# Patient Record
Sex: MALE | Race: BLACK OR AFRICAN AMERICAN | Employment: FULL TIME | URBAN - METROPOLITAN AREA
[De-identification: names, ages, dates, MRNs, and addresses within clinical notes are randomized per-mention and may not be internally consistent; named-entity substitution may affect disease eponyms.]

---

## 2024-08-19 ENCOUNTER — OFFICE VISIT (OUTPATIENT)
Dept: PRIMARY CARE CLINIC | Age: 34
End: 2024-08-19
Payer: COMMERCIAL

## 2024-08-19 ENCOUNTER — HOSPITAL ENCOUNTER (OUTPATIENT)
Age: 34
Discharge: HOME | End: 2024-08-19
Payer: COMMERCIAL

## 2024-08-19 VITALS
OXYGEN SATURATION: 97 % | HEART RATE: 101 BPM | BODY MASS INDEX: 40.16 KG/M2 | HEIGHT: 68 IN | TEMPERATURE: 98.2 F | SYSTOLIC BLOOD PRESSURE: 120 MMHG | DIASTOLIC BLOOD PRESSURE: 85 MMHG | WEIGHT: 265 LBS

## 2024-08-19 DIAGNOSIS — D17.21 LIPOMA OF RIGHT UPPER EXTREMITY: ICD-10-CM

## 2024-08-19 DIAGNOSIS — Z00.00 ANNUAL PHYSICAL EXAM: ICD-10-CM

## 2024-08-19 DIAGNOSIS — E11.9 TYPE 2 DIABETES MELLITUS WITHOUT COMPLICATION, WITHOUT LONG-TERM CURRENT USE OF INSULIN (HCC): ICD-10-CM

## 2024-08-19 DIAGNOSIS — Z00.00 ANNUAL PHYSICAL EXAM: Primary | ICD-10-CM

## 2024-08-19 LAB
ALBUMIN SERPL-MCNC: 4.2 G/DL (ref 3.4–5)
ALBUMIN/GLOB SERPL: 1.5 {RATIO} (ref 1.1–2.2)
ALP SERPL-CCNC: 74 U/L (ref 40–129)
ALT SERPL-CCNC: 25 U/L (ref 10–40)
ANION GAP SERPL CALCULATED.3IONS-SCNC: 10 MMOL/L (ref 3–16)
AST SERPL-CCNC: 26 U/L (ref 15–37)
BASOPHILS # BLD: 0.1 K/UL (ref 0–0.2)
BASOPHILS NFR BLD: 0.9 %
BILIRUB SERPL-MCNC: 0.4 MG/DL (ref 0–1)
BUN SERPL-MCNC: 16 MG/DL (ref 7–20)
CALCIUM SERPL-MCNC: 9.5 MG/DL (ref 8.3–10.6)
CHLORIDE SERPL-SCNC: 103 MMOL/L (ref 99–110)
CHOLEST SERPL-MCNC: 154 MG/DL (ref 0–199)
CO2 SERPL-SCNC: 21 MMOL/L (ref 21–32)
CREAT SERPL-MCNC: 1.2 MG/DL (ref 0.9–1.3)
CREAT UR-MCNC: 421 MG/DL (ref 39–259)
DEPRECATED RDW RBC AUTO: 13.3 % (ref 12.4–15.4)
EOSINOPHIL # BLD: 0 K/UL (ref 0–0.6)
EOSINOPHIL NFR BLD: 0.6 %
GFR SERPLBLD CREATININE-BSD FMLA CKD-EPI: 80 ML/MIN/{1.73_M2}
GLUCOSE SERPL-MCNC: 172 MG/DL (ref 70–99)
HCT VFR BLD AUTO: 37.9 % (ref 40.5–52.5)
HDLC SERPL-MCNC: 31 MG/DL (ref 40–60)
HGB BLD-MCNC: 11.6 G/DL (ref 13.5–17.5)
LDLC SERPL CALC-MCNC: 93 MG/DL
LYMPHOCYTES # BLD: 2.9 K/UL (ref 1–5.1)
LYMPHOCYTES NFR BLD: 38.9 %
MCH RBC QN AUTO: 23.9 PG (ref 26–34)
MCHC RBC AUTO-ENTMCNC: 30.7 G/DL (ref 31–36)
MCV RBC AUTO: 78.1 FL (ref 80–100)
MICROALBUMIN UR DL<=1MG/L-MCNC: 2.84 MG/DL
MICROALBUMIN/CREAT UR: 6.7 MG/G (ref 0–30)
MONOCYTES # BLD: 0.7 K/UL (ref 0–1.3)
MONOCYTES NFR BLD: 8.8 %
NEUTROPHILS # BLD: 3.8 K/UL (ref 1.7–7.7)
NEUTROPHILS NFR BLD: 50.8 %
PLATELET # BLD AUTO: 313 K/UL (ref 135–450)
PMV BLD AUTO: 10.6 FL (ref 5–10.5)
POTASSIUM SERPL-SCNC: 3.9 MMOL/L (ref 3.5–5.1)
PROT SERPL-MCNC: 7 G/DL (ref 6.4–8.2)
RBC # BLD AUTO: 4.86 M/UL (ref 4.2–5.9)
SODIUM SERPL-SCNC: 134 MMOL/L (ref 136–145)
TRIGL SERPL-MCNC: 152 MG/DL (ref 0–150)
VLDLC SERPL CALC-MCNC: 30 MG/DL
WBC # BLD AUTO: 7.5 K/UL (ref 4–11)

## 2024-08-19 PROCEDURE — 83036 HEMOGLOBIN GLYCOSYLATED A1C: CPT

## 2024-08-19 PROCEDURE — 80061 LIPID PANEL: CPT

## 2024-08-19 PROCEDURE — 85025 COMPLETE CBC W/AUTO DIFF WBC: CPT

## 2024-08-19 PROCEDURE — 82043 UR ALBUMIN QUANTITATIVE: CPT

## 2024-08-19 PROCEDURE — 36415 COLL VENOUS BLD VENIPUNCTURE: CPT

## 2024-08-19 PROCEDURE — 80053 COMPREHEN METABOLIC PANEL: CPT

## 2024-08-19 PROCEDURE — 99385 PREV VISIT NEW AGE 18-39: CPT

## 2024-08-19 PROCEDURE — 82570 ASSAY OF URINE CREATININE: CPT

## 2024-08-19 RX ORDER — DULAGLUTIDE 1.5 MG/.5ML
3 INJECTION, SOLUTION SUBCUTANEOUS WEEKLY
COMMUNITY
Start: 2024-07-15

## 2024-08-19 SDOH — ECONOMIC STABILITY: INCOME INSECURITY: HOW HARD IS IT FOR YOU TO PAY FOR THE VERY BASICS LIKE FOOD, HOUSING, MEDICAL CARE, AND HEATING?: SOMEWHAT HARD

## 2024-08-19 SDOH — HEALTH STABILITY: PHYSICAL HEALTH: ON AVERAGE, HOW MANY MINUTES DO YOU ENGAGE IN EXERCISE AT THIS LEVEL?: 90 MIN

## 2024-08-19 SDOH — ECONOMIC STABILITY: FOOD INSECURITY: WITHIN THE PAST 12 MONTHS, YOU WORRIED THAT YOUR FOOD WOULD RUN OUT BEFORE YOU GOT MONEY TO BUY MORE.: NEVER TRUE

## 2024-08-19 SDOH — ECONOMIC STABILITY: FOOD INSECURITY: WITHIN THE PAST 12 MONTHS, THE FOOD YOU BOUGHT JUST DIDN'T LAST AND YOU DIDN'T HAVE MONEY TO GET MORE.: NEVER TRUE

## 2024-08-19 SDOH — HEALTH STABILITY: PHYSICAL HEALTH: ON AVERAGE, HOW MANY DAYS PER WEEK DO YOU ENGAGE IN MODERATE TO STRENUOUS EXERCISE (LIKE A BRISK WALK)?: 3 DAYS

## 2024-08-19 ASSESSMENT — PATIENT HEALTH QUESTIONNAIRE - PHQ9
2. FEELING DOWN, DEPRESSED OR HOPELESS: NOT AT ALL
SUM OF ALL RESPONSES TO PHQ QUESTIONS 1-9: 0
SUM OF ALL RESPONSES TO PHQ QUESTIONS 1-9: 0
1. LITTLE INTEREST OR PLEASURE IN DOING THINGS: NOT AT ALL
SUM OF ALL RESPONSES TO PHQ9 QUESTIONS 1 & 2: 0
SUM OF ALL RESPONSES TO PHQ QUESTIONS 1-9: 0
SUM OF ALL RESPONSES TO PHQ QUESTIONS 1-9: 0

## 2024-08-19 ASSESSMENT — ENCOUNTER SYMPTOMS
CONSTIPATION: 0
VOMITING: 0
WHEEZING: 0
NAUSEA: 0
DIARRHEA: 0
COUGH: 0
SHORTNESS OF BREATH: 0

## 2024-08-19 NOTE — PROGRESS NOTES
PROGRESS NOTE   [facility] Family and Community Medicine [facility]idency [facility]                                  [address], [address], [facility] [address]         Phone: [phone]    Date of Service:  8/19/2024     Patient ID: Tom Ryder is a 34 y.o. male      Subjective:     CC:   Chief Complaint   Patient presents with    New Patient       HPI  Patient is a 34 y.o. year old male that has Type 2 diabetes mellitus (HCC) and Microcytic anemia on their problem list..  Patient is presenting for above mentioned concerns.    Last A1c of 7.7 in January  Taking medications appropriately  No concerns with medications  Has been trying to improve diet  Does no monitor sugars at home    Concerns of mass on right arm  Ongoing for a while now  Minimal changes in size  Triceps region  No pain, swelling, tenderness, warmth, or drainage    ROS:    Review of Systems   Constitutional:  Negative for chills, fatigue and fever.   HENT:  Negative for congestion and tinnitus.    Eyes:  Negative for visual disturbance.   Respiratory:  Negative for cough, shortness of breath and wheezing.    Cardiovascular:  Negative for chest pain, palpitations and leg swelling.   Gastrointestinal:  Negative for constipation, diarrhea, nausea and vomiting.   Genitourinary:  Negative for difficulty urinating, dysuria, flank pain and hematuria.   Neurological:  Negative for dizziness, weakness, light-headedness, numbness and headaches.       Vitals:    08/19/24 1110 08/19/24 1119 08/19/24 1138   BP: (!) 142/102 (!) 146/104 120/85   Site: Right Upper Arm Right Upper Arm    Position: Sitting Sitting    Cuff Size: Large Adult Large Adult    Pulse: (!) 101     Temp: 98.2 °F (36.8 °C)     TempSrc: Oral     SpO2: 97%     Weight: 120.2 kg (265 lb)     Height: 1.718 m (5' 7.64\")         Allergies:  Patient has no known allergies.    Outpatient Medications Marked as Taking for the 8/19/24 encounter (Office Visit) with Mo

## 2024-08-19 NOTE — ASSESSMENT & PLAN NOTE
Unclear control, continue current medications  Last A1c of 7.7 in Jan 2024  Will repeat A1c check today

## 2024-08-20 LAB
EST. AVERAGE GLUCOSE BLD GHB EST-MCNC: 122.6 MG/DL
HBA1C MFR BLD: 5.9 %

## 2024-12-06 ENCOUNTER — OFFICE VISIT (OUTPATIENT)
Dept: PRIMARY CARE CLINIC | Age: 34
End: 2024-12-06
Payer: COMMERCIAL

## 2024-12-06 ENCOUNTER — HOSPITAL ENCOUNTER (OUTPATIENT)
Age: 34
Discharge: HOME | End: 2024-12-06
Payer: COMMERCIAL

## 2024-12-06 VITALS
HEART RATE: 91 BPM | DIASTOLIC BLOOD PRESSURE: 88 MMHG | WEIGHT: 258.4 LBS | OXYGEN SATURATION: 98 % | BODY MASS INDEX: 39.71 KG/M2 | SYSTOLIC BLOOD PRESSURE: 122 MMHG

## 2024-12-06 DIAGNOSIS — E11.9 TYPE 2 DIABETES MELLITUS WITHOUT COMPLICATION, WITHOUT LONG-TERM CURRENT USE OF INSULIN (HCC): Primary | ICD-10-CM

## 2024-12-06 DIAGNOSIS — E11.9 TYPE 2 DIABETES MELLITUS WITHOUT COMPLICATION, WITHOUT LONG-TERM CURRENT USE OF INSULIN (HCC): ICD-10-CM

## 2024-12-06 LAB
ALBUMIN SERPL-MCNC: 4.2 G/DL (ref 3.4–5)
ALBUMIN/GLOB SERPL: 1.4 {RATIO} (ref 1.1–2.2)
ALP SERPL-CCNC: 82 U/L (ref 40–129)
ALT SERPL-CCNC: 82 U/L (ref 10–40)
ANION GAP SERPL CALCULATED.3IONS-SCNC: 12 MMOL/L (ref 3–16)
AST SERPL-CCNC: 37 U/L (ref 15–37)
BILIRUB SERPL-MCNC: 0.3 MG/DL (ref 0–1)
BUN SERPL-MCNC: 18 MG/DL (ref 7–20)
CALCIUM SERPL-MCNC: 9.5 MG/DL (ref 8.3–10.6)
CHLORIDE SERPL-SCNC: 105 MMOL/L (ref 99–110)
CHOLEST SERPL-MCNC: 163 MG/DL (ref 0–199)
CO2 SERPL-SCNC: 22 MMOL/L (ref 21–32)
CREAT SERPL-MCNC: 1.2 MG/DL (ref 0.9–1.3)
EST. AVERAGE GLUCOSE BLD GHB EST-MCNC: 139.9 MG/DL
GFR SERPLBLD CREATININE-BSD FMLA CKD-EPI: 80 ML/MIN/{1.73_M2}
GLUCOSE SERPL-MCNC: 122 MG/DL (ref 70–99)
HBA1C MFR BLD: 6.5 %
HDLC SERPL-MCNC: 32 MG/DL (ref 40–60)
LDLC SERPL CALC-MCNC: 108 MG/DL
POTASSIUM SERPL-SCNC: 4.3 MMOL/L (ref 3.5–5.1)
PROT SERPL-MCNC: 7.1 G/DL (ref 6.4–8.2)
SODIUM SERPL-SCNC: 139 MMOL/L (ref 136–145)
TRIGL SERPL-MCNC: 115 MG/DL (ref 0–150)
VLDLC SERPL CALC-MCNC: 23 MG/DL

## 2024-12-06 PROCEDURE — 3044F HG A1C LEVEL LT 7.0%: CPT | Performed by: FAMILY MEDICINE

## 2024-12-06 PROCEDURE — 99213 OFFICE O/P EST LOW 20 MIN: CPT | Performed by: FAMILY MEDICINE

## 2024-12-06 PROCEDURE — 80053 COMPREHEN METABOLIC PANEL: CPT

## 2024-12-06 PROCEDURE — 36415 COLL VENOUS BLD VENIPUNCTURE: CPT

## 2024-12-06 PROCEDURE — 80061 LIPID PANEL: CPT

## 2024-12-06 PROCEDURE — 83036 HEMOGLOBIN GLYCOSYLATED A1C: CPT

## 2024-12-06 RX ORDER — CLINDAMYCIN PHOSPHATE 10 UG/ML
LOTION TOPICAL
COMMUNITY
Start: 2024-11-19

## 2024-12-06 RX ORDER — DULAGLUTIDE 3 MG/.5ML
3 INJECTION, SOLUTION SUBCUTANEOUS WEEKLY
Qty: 6 ML | Refills: 3 | Status: SHIPPED | OUTPATIENT
Start: 2024-12-06

## 2024-12-06 ASSESSMENT — ENCOUNTER SYMPTOMS
COUGH: 0
CHEST TIGHTNESS: 0
WHEEZING: 0
SHORTNESS OF BREATH: 0
ABDOMINAL PAIN: 1
DIARRHEA: 1

## 2024-12-06 NOTE — PROGRESS NOTES
Glucose 08/19/2024 172 (H)  70 - 99 mg/dL Final    BUN 08/19/2024 16  7 - 20 mg/dL Final    Creatinine 08/19/2024 1.2  0.9 - 1.3 mg/dL Final    Est, Glom Filt Rate 08/19/2024 80  >60 Final    Calcium 08/19/2024 9.5  8.3 - 10.6 mg/dL Final    Total Protein 08/19/2024 7.0  6.4 - 8.2 g/dL Final    Albumin 08/19/2024 4.2  3.4 - 5.0 g/dL Final    Albumin/Globulin Ratio 08/19/2024 1.5  1.1 - 2.2 Final    Total Bilirubin 08/19/2024 0.4  0.0 - 1.0 mg/dL Final    Alkaline Phosphatase 08/19/2024 74  40 - 129 U/L Final    ALT 08/19/2024 25  10 - 40 U/L Final    AST 08/19/2024 26  15 - 37 U/L Final    Cholesterol, Total 08/19/2024 154  0 - 199 mg/dL Final    Triglycerides 08/19/2024 152 (H)  0 - 150 mg/dL Final    HDL 08/19/2024 31 (L)  40 - 60 mg/dL Final    LDL Cholesterol 08/19/2024 93  <100 mg/dL Final    VLDL Cholesterol Calculated 08/19/2024 30  Not Established mg/dL Final       An electronic signature was used to authenticate this note.  --Matt Cadet MD

## 2025-02-10 ENCOUNTER — PATIENT MESSAGE (OUTPATIENT)
Dept: PRIMARY CARE CLINIC | Age: 35
End: 2025-02-10

## 2025-02-10 DIAGNOSIS — M79.10 MYALGIA: Primary | ICD-10-CM

## 2025-02-10 DIAGNOSIS — R03.0 ELEVATED BLOOD PRESSURE READING WITHOUT DIAGNOSIS OF HYPERTENSION: ICD-10-CM

## 2025-02-11 NOTE — TELEPHONE ENCOUNTER
[phone]    Patient not available VM advises to text or email VM not checked regularly.  [employer] message sent will try again at 430 pm.

## 2025-02-11 NOTE — TELEPHONE ENCOUNTER
[phone]    2/14/2025 11:40 AM (40 min)  Sena    OFFICE VISIT   [facility] ([facility])   Rudy Santos MD

## 2025-02-12 SDOH — ECONOMIC STABILITY: INCOME INSECURITY: IN THE LAST 12 MONTHS, WAS THERE A TIME WHEN YOU WERE NOT ABLE TO PAY THE MORTGAGE OR RENT ON TIME?: PATIENT DECLINED

## 2025-02-12 SDOH — ECONOMIC STABILITY: FOOD INSECURITY: WITHIN THE PAST 12 MONTHS, THE FOOD YOU BOUGHT JUST DIDN'T LAST AND YOU DIDN'T HAVE MONEY TO GET MORE.: PATIENT DECLINED

## 2025-02-12 SDOH — ECONOMIC STABILITY: TRANSPORTATION INSECURITY
IN THE PAST 12 MONTHS, HAS LACK OF TRANSPORTATION KEPT YOU FROM MEETINGS, WORK, OR FROM GETTING THINGS NEEDED FOR DAILY LIVING?: NO

## 2025-02-12 SDOH — ECONOMIC STABILITY: FOOD INSECURITY: WITHIN THE PAST 12 MONTHS, YOU WORRIED THAT YOUR FOOD WOULD RUN OUT BEFORE YOU GOT MONEY TO BUY MORE.: SOMETIMES TRUE

## 2025-02-12 SDOH — ECONOMIC STABILITY: TRANSPORTATION INSECURITY
IN THE PAST 12 MONTHS, HAS THE LACK OF TRANSPORTATION KEPT YOU FROM MEDICAL APPOINTMENTS OR FROM GETTING MEDICATIONS?: NO

## 2025-02-12 ASSESSMENT — PATIENT HEALTH QUESTIONNAIRE - PHQ9
SUM OF ALL RESPONSES TO PHQ QUESTIONS 1-9: 0
2. FEELING DOWN, DEPRESSED OR HOPELESS: NOT AT ALL
SUM OF ALL RESPONSES TO PHQ9 QUESTIONS 1 & 2: 0
1. LITTLE INTEREST OR PLEASURE IN DOING THINGS: NOT AT ALL
SUM OF ALL RESPONSES TO PHQ9 QUESTIONS 1 & 2: 0
SUM OF ALL RESPONSES TO PHQ QUESTIONS 1-9: 0
1. LITTLE INTEREST OR PLEASURE IN DOING THINGS: NOT AT ALL
SUM OF ALL RESPONSES TO PHQ QUESTIONS 1-9: 0
2. FEELING DOWN, DEPRESSED OR HOPELESS: NOT AT ALL
SUM OF ALL RESPONSES TO PHQ QUESTIONS 1-9: 0

## 2025-02-13 RX ORDER — VALSARTAN AND HYDROCHLOROTHIAZIDE 80; 12.5 MG/1; MG/1
1 TABLET, FILM COATED ORAL DAILY
Qty: 30 TABLET | Refills: 5 | Status: SHIPPED | OUTPATIENT
Start: 2025-02-13

## 2025-02-14 ENCOUNTER — HOSPITAL ENCOUNTER (OUTPATIENT)
Age: 35
Discharge: HOME | End: 2025-02-14
Payer: COMMERCIAL

## 2025-02-14 ENCOUNTER — OFFICE VISIT (OUTPATIENT)
Dept: PRIMARY CARE CLINIC | Age: 35
End: 2025-02-14

## 2025-02-14 VITALS
DIASTOLIC BLOOD PRESSURE: 100 MMHG | SYSTOLIC BLOOD PRESSURE: 125 MMHG | OXYGEN SATURATION: 98 % | BODY MASS INDEX: 39.77 KG/M2 | TEMPERATURE: 98.1 F | HEART RATE: 101 BPM | WEIGHT: 258.8 LBS

## 2025-02-14 DIAGNOSIS — R03.0 ELEVATED BLOOD PRESSURE READING WITHOUT DIAGNOSIS OF HYPERTENSION: Primary | ICD-10-CM

## 2025-02-14 DIAGNOSIS — M79.10 MYALGIA: ICD-10-CM

## 2025-02-14 DIAGNOSIS — R19.5 DARK STOOLS: ICD-10-CM

## 2025-02-14 DIAGNOSIS — R53.83 FATIGUE, UNSPECIFIED TYPE: ICD-10-CM

## 2025-02-14 DIAGNOSIS — R03.0 ELEVATED BLOOD PRESSURE READING WITHOUT DIAGNOSIS OF HYPERTENSION: ICD-10-CM

## 2025-02-14 DIAGNOSIS — R82.71 BACTERIA IN URINE: ICD-10-CM

## 2025-02-14 LAB
ALBUMIN SERPL-MCNC: 4.2 G/DL (ref 3.4–5)
ALBUMIN/GLOB SERPL: 1.6 {RATIO} (ref 1.1–2.2)
ALP SERPL-CCNC: 77 U/L (ref 40–129)
ALT SERPL-CCNC: 72 U/L (ref 10–40)
ANION GAP SERPL CALCULATED.3IONS-SCNC: 11 MMOL/L (ref 3–16)
AST SERPL-CCNC: 39 U/L (ref 15–37)
BILIRUB SERPL-MCNC: 0.3 MG/DL (ref 0–1)
BILIRUBIN, POC: NEGATIVE
BLOOD URINE, POC: NEGATIVE
BUN SERPL-MCNC: 16 MG/DL (ref 7–20)
CALCIUM SERPL-MCNC: 9.3 MG/DL (ref 8.3–10.6)
CHLORIDE SERPL-SCNC: 103 MMOL/L (ref 99–110)
CK SERPL-CCNC: 368 U/L (ref 39–308)
CLARITY, POC: CLEAR
CO2 SERPL-SCNC: 23 MMOL/L (ref 21–32)
COLOR, POC: YELLOW
CREAT SERPL-MCNC: 1.1 MG/DL (ref 0.9–1.3)
GFR SERPLBLD CREATININE-BSD FMLA CKD-EPI: 89 ML/MIN/{1.73_M2}
GLUCOSE SERPL-MCNC: 216 MG/DL (ref 70–99)
GLUCOSE URINE, POC: 250 MG/DL
KETONES, POC: NEGATIVE MG/DL
LEUKOCYTE EST, POC: NEGATIVE
NITRITE, POC: NEGATIVE
PH, POC: 6
POTASSIUM SERPL-SCNC: 4.5 MMOL/L (ref 3.5–5.1)
PROT SERPL-MCNC: 6.8 G/DL (ref 6.4–8.2)
PROTEIN, POC: 100 MG/DL
SODIUM SERPL-SCNC: 137 MMOL/L (ref 136–145)
SPECIFIC GRAVITY, POC: 1.02
TSH SERPL DL<=0.005 MIU/L-ACNC: 1.2 UIU/ML (ref 0.27–4.2)
UROBILINOGEN, POC: 0.2 MG/DL

## 2025-02-14 PROCEDURE — 80053 COMPREHEN METABOLIC PANEL: CPT

## 2025-02-14 PROCEDURE — 36415 COLL VENOUS BLD VENIPUNCTURE: CPT

## 2025-02-14 PROCEDURE — 82550 ASSAY OF CK (CPK): CPT

## 2025-02-14 PROCEDURE — 84443 ASSAY THYROID STIM HORMONE: CPT

## 2025-02-14 ASSESSMENT — ENCOUNTER SYMPTOMS: BLOOD IN STOOL: 1

## 2025-02-14 NOTE — PROGRESS NOTES
[facility]                               Family and Community Medicine Residency Practice                                             [address], [address], [place] [address]        Phone: [phone]      Name:  Gold Ryder  :    1990    Consultants:   Patient Care Team:  Matt Cadet MD as PCP - General  Matt Cadet MD as PCP - Empaneled Provider      Chief Complaint:     Gold Ryder is a 34 y.o. male  who presents today for an established patient care visit with Personalized Prevention Plan Services as noted below.      Assessment/Plan:      1. Elevated blood pressure reading without diagnosis of hypertension  33 yo M presenting with history of elevated blood pressure readings on several occasions, with no official diagnosis of HTN. Repeat BP's in office also elevated. Patent recently started on diovan-HCT. Instructed patient to follow up in 2 weeks with completed BP log to guide further management.     2. Dark stools  ED notes and bloodwork from [facility] reviewed. Today he is feeling much better as his abdominal pain has resolved and his stool has returned to normal. Recent labs show microcytic anemia, with renal function and hepatic function WNL. Pending repeat CBC to assess for worsening anemia.   - Roxi Roa MD, Gastroenterology, [facility]    3. Fatigue, unspecified type  The patients associated fatigue may have been to anemia from an acute GI bleed, but may be chronic due to the patient's work schedule. Ordering TSH to r/o hyperthyroidism.   - TSH reflex to FT4; Future    4. Bacteria in urine  Bacteria noted in urine on lab work from recent ED visit. UA in office significant for elevated glucose. Will send for culture to r/o UTI due to co-occurring diabetes and dulaglutide regimen.   - POCT Urinalysis no Micro  - Culture, Urine       Health Maintenance Due:  Health Maintenance Due   Topic Date Due

## 2025-02-14 NOTE — PATIENT INSTRUCTIONS
Please keep log of your blood pressure in the morning and night for 2 weeks in preparation for next visit.

## 2025-02-16 LAB — BACTERIA UR CULT: NORMAL

## 2025-02-28 ENCOUNTER — PATIENT MESSAGE (OUTPATIENT)
Dept: PRIMARY CARE CLINIC | Age: 35
End: 2025-02-28

## 2025-02-28 ENCOUNTER — OFFICE VISIT (OUTPATIENT)
Dept: PRIMARY CARE CLINIC | Age: 35
End: 2025-02-28
Payer: COMMERCIAL

## 2025-02-28 VITALS
BODY MASS INDEX: 37.47 KG/M2 | TEMPERATURE: 97.6 F | RESPIRATION RATE: 16 BRPM | WEIGHT: 247.2 LBS | OXYGEN SATURATION: 98 % | SYSTOLIC BLOOD PRESSURE: 138 MMHG | DIASTOLIC BLOOD PRESSURE: 80 MMHG | HEART RATE: 90 BPM | HEIGHT: 68 IN

## 2025-02-28 DIAGNOSIS — I21.19 ST ELEVATION MYOCARDIAL INFARCTION (STEMI) INVOLVING OTHER CORONARY ARTERY OF INFERIOR WALL (HCC): Primary | ICD-10-CM

## 2025-02-28 PROCEDURE — 99214 OFFICE O/P EST MOD 30 MIN: CPT

## 2025-02-28 RX ORDER — CARVEDILOL 25 MG/1
25 TABLET ORAL 2 TIMES DAILY WITH MEALS
COMMUNITY
Start: 2025-02-20

## 2025-02-28 RX ORDER — FAMOTIDINE 20 MG/1
20 TABLET, FILM COATED ORAL 2 TIMES DAILY PRN
COMMUNITY
Start: 2025-02-11

## 2025-02-28 RX ORDER — SACUBITRIL AND VALSARTAN 24; 26 MG/1; MG/1
1 TABLET, FILM COATED ORAL 2 TIMES DAILY
COMMUNITY
Start: 2025-02-20

## 2025-02-28 RX ORDER — SPIRONOLACTONE 25 MG/1
25 TABLET ORAL DAILY
COMMUNITY
Start: 2025-02-21

## 2025-02-28 RX ORDER — ASPIRIN 81 MG/1
81 TABLET, CHEWABLE ORAL DAILY
COMMUNITY
Start: 2025-02-21

## 2025-02-28 RX ORDER — CLOPIDOGREL BISULFATE 75 MG/1
75 TABLET ORAL DAILY
COMMUNITY
Start: 2025-02-21

## 2025-02-28 RX ORDER — ATORVASTATIN CALCIUM 40 MG/1
40 TABLET, FILM COATED ORAL DAILY
COMMUNITY
Start: 2025-02-21

## 2025-02-28 RX ORDER — INSULIN GLARGINE-YFGN 100 [IU]/ML
INJECTION, SOLUTION SUBCUTANEOUS
COMMUNITY
Start: 2025-02-25

## 2025-02-28 RX ORDER — INSULIN GLARGINE 100 [IU]/ML
10 INJECTION, SOLUTION SUBCUTANEOUS NIGHTLY
COMMUNITY
Start: 2025-02-20

## 2025-02-28 RX ORDER — MULTIVITAMIN
1 TABLET ORAL DAILY
COMMUNITY

## 2025-02-28 RX ORDER — DAPAGLIFLOZIN 10 MG/1
10 TABLET, FILM COATED ORAL DAILY
COMMUNITY
Start: 2025-02-21

## 2025-02-28 ASSESSMENT — ENCOUNTER SYMPTOMS: SHORTNESS OF BREATH: 0

## 2025-02-28 NOTE — PROGRESS NOTES
NEGATIVE     Ketones, UA 02/14/2025 NEGATIVE     Spec Grav, UA 02/14/2025 1.025     Blood, UA POC 02/14/2025 NEGATIVE     pH, UA 02/14/2025 6.0     Protein, UA POC 02/14/2025 100     Urobilinogen, UA 02/14/2025 0.2     Leukocytes, UA 02/14/2025 NEGATIVE     Nitrite, UA 02/14/2025 NEGATIVE     Urine Culture, Routine 02/14/2025 No growth at 18 to 36 hours               Health Maintenance: (USPSTF Recommendations)  (F) Breast Cancer Screen: (40-49 (C), 50-74 biennial screening mammogram (B))  (F) Cervical Cancer Screen: (21-29 q3yr cytology alone; 30-65 q3yr cytology alone, q5yr with hrHPV alone, or q5yr cytology+hrHPV (A))  (M) Prostate Cancer Screen: (55-68 yo discuss benefits/harm, does not recommend testing PSA in men >71 yo (D):   (M) AAA Screen: (men 65-76 yo who has ever smoked (B), consider in nonsmokers if high risk):  CRC/Colonoscopy Screening: (adults 45-49 (B), 50-75 (A))  Lung Ca Screening: Annual LDCT (+smoker age 50-80, smoked within 15 years, total of 20 pack yr history (B)):  DEXA Screen: (women >65 and older, <65 if at risk/postmenopausal (B))  HIV Screen: (15-65 yr old, and all pregnant patients (A)):  Hep C Screen: (18-79 yr old (B)):  HCC Screen: (all pts with cirrhosis and high risk Hep B (US q6 mo)):  Immunizations:    RTC:  No follow-ups on file.   Return to clinic if symptoms worsen or fail to improve.     EMR Dragon/transcription disclaimer:  Much of this encounter note is electronic transcription/translation of spoken language to printed texts.  The electronic translation of spoken language may be erroneous, or at times, nonsensical words or phrases may be inadvertently transcribed.  Although I have reviewed the note for such errors, some may still exist.          Rudy Santos MD  PGY-1 Family and Community Medicine Residency

## 2025-03-03 NOTE — TELEPHONE ENCOUNTER
I would strongly recommend NO alcohol especially as you recover from heart attack  If you are experiencing ongoing chest pain, I recommend you either come into our office to be evaluated or if it feels like chest pressure that radiates to your left jaw or left arm then I recommend an immediate trip to the ER.

## 2025-03-04 ENCOUNTER — TELEPHONE (OUTPATIENT)
Dept: PRIMARY CARE CLINIC | Age: 35
End: 2025-03-04

## 2025-03-04 NOTE — TELEPHONE ENCOUNTER
[phone]  Spoke to pt. He stated to fax to [employer] Attn: Peg Dixon at   [phone]    Forms scanned into media as well as attached in [employer] message for pt to have a copy

## 2025-03-04 NOTE — TELEPHONE ENCOUNTER
Patient is calling and stating that his [facility] paper work is trying to be faxed to his phone number and it needs to be faxed to [phone]. Please advise

## 2025-03-04 NOTE — TELEPHONE ENCOUNTER
Called pt he stated not concerned at this time not sure what was going on and has not happened again.

## 2025-03-24 ENCOUNTER — TELEPHONE (OUTPATIENT)
Dept: PRIMARY CARE CLINIC | Age: 35
End: 2025-03-24

## 2025-03-24 NOTE — TELEPHONE ENCOUNTER
Pharmacy called patient pharmacy stating his   Dulaglutide (TRULICITY) 3 MG/0.5ML SOA needs a pa will route to the pa department

## 2025-03-25 NOTE — TELEPHONE ENCOUNTER
Submitted PA for Dulaglutide (TRULICITY) 3 MG/0.5ML SOAJ   [facility]  [id number] STATUS: PENDING.    Follow up done daily; if no decision with in three days we will refax.  If another three days goes by with no decision will call the insurance for status.

## 2025-03-28 ENCOUNTER — OFFICE VISIT (OUTPATIENT)
Dept: PRIMARY CARE CLINIC | Age: 35
End: 2025-03-28
Payer: COMMERCIAL

## 2025-03-28 VITALS
DIASTOLIC BLOOD PRESSURE: 78 MMHG | BODY MASS INDEX: 38.73 KG/M2 | HEART RATE: 87 BPM | WEIGHT: 252 LBS | SYSTOLIC BLOOD PRESSURE: 96 MMHG | OXYGEN SATURATION: 99 %

## 2025-03-28 DIAGNOSIS — I25.2 HISTORY OF ACUTE MYOCARDIAL INFARCTION OF INFERIOR WALL: ICD-10-CM

## 2025-03-28 DIAGNOSIS — E11.9 TYPE 2 DIABETES MELLITUS WITHOUT COMPLICATION, WITHOUT LONG-TERM CURRENT USE OF INSULIN: ICD-10-CM

## 2025-03-28 DIAGNOSIS — I10 ESSENTIAL HYPERTENSION: Primary | ICD-10-CM

## 2025-03-28 DIAGNOSIS — I50.22 CHRONIC SYSTOLIC HEART FAILURE (HCC): ICD-10-CM

## 2025-03-28 PROCEDURE — 99214 OFFICE O/P EST MOD 30 MIN: CPT

## 2025-03-28 PROCEDURE — 3078F DIAST BP <80 MM HG: CPT

## 2025-03-28 PROCEDURE — 3074F SYST BP LT 130 MM HG: CPT

## 2025-03-28 RX ORDER — ACYCLOVIR 400 MG/1
1 TABLET ORAL
Qty: 9 EACH | Refills: 1 | Status: SHIPPED | OUTPATIENT
Start: 2025-03-28 | End: 2025-09-24

## 2025-03-28 RX ORDER — ACYCLOVIR 400 MG/1
1 TABLET ORAL DAILY
Qty: 1 EACH | Refills: 0 | Status: SHIPPED | OUTPATIENT
Start: 2025-03-28

## 2025-03-28 SDOH — ECONOMIC STABILITY: FOOD INSECURITY: WITHIN THE PAST 12 MONTHS, YOU WORRIED THAT YOUR FOOD WOULD RUN OUT BEFORE YOU GOT MONEY TO BUY MORE.: NEVER TRUE

## 2025-03-28 SDOH — ECONOMIC STABILITY: FOOD INSECURITY: WITHIN THE PAST 12 MONTHS, THE FOOD YOU BOUGHT JUST DIDN'T LAST AND YOU DIDN'T HAVE MONEY TO GET MORE.: NEVER TRUE

## 2025-03-28 ASSESSMENT — PATIENT HEALTH QUESTIONNAIRE - PHQ9
6. FEELING BAD ABOUT YOURSELF - OR THAT YOU ARE A FAILURE OR HAVE LET YOURSELF OR YOUR FAMILY DOWN: NOT AT ALL
10. IF YOU CHECKED OFF ANY PROBLEMS, HOW DIFFICULT HAVE THESE PROBLEMS MADE IT FOR YOU TO DO YOUR WORK, TAKE CARE OF THINGS AT HOME, OR GET ALONG WITH OTHER PEOPLE: NOT DIFFICULT AT ALL
4. FEELING TIRED OR HAVING LITTLE ENERGY: SEVERAL DAYS
SUM OF ALL RESPONSES TO PHQ QUESTIONS 1-9: 1
7. TROUBLE CONCENTRATING ON THINGS, SUCH AS READING THE NEWSPAPER OR WATCHING TELEVISION: NOT AT ALL
SUM OF ALL RESPONSES TO PHQ QUESTIONS 1-9: 1
5. POOR APPETITE OR OVEREATING: NOT AT ALL
SUM OF ALL RESPONSES TO PHQ QUESTIONS 1-9: 1
SUM OF ALL RESPONSES TO PHQ QUESTIONS 1-9: 1
9. THOUGHTS THAT YOU WOULD BE BETTER OFF DEAD, OR OF HURTING YOURSELF: NOT AT ALL
2. FEELING DOWN, DEPRESSED OR HOPELESS: NOT AT ALL
3. TROUBLE FALLING OR STAYING ASLEEP: NOT AT ALL
8. MOVING OR SPEAKING SO SLOWLY THAT OTHER PEOPLE COULD HAVE NOTICED. OR THE OPPOSITE, BEING SO FIGETY OR RESTLESS THAT YOU HAVE BEEN MOVING AROUND A LOT MORE THAN USUAL: NOT AT ALL
1. LITTLE INTEREST OR PLEASURE IN DOING THINGS: NOT AT ALL

## 2025-03-28 ASSESSMENT — ANXIETY QUESTIONNAIRES
5. BEING SO RESTLESS THAT IT IS HARD TO SIT STILL: NOT AT ALL
3. WORRYING TOO MUCH ABOUT DIFFERENT THINGS: NOT AT ALL
7. FEELING AFRAID AS IF SOMETHING AWFUL MIGHT HAPPEN: NOT AT ALL
4. TROUBLE RELAXING: NOT AT ALL
1. FEELING NERVOUS, ANXIOUS, OR ON EDGE: NOT AT ALL
GAD7 TOTAL SCORE: 0
IF YOU CHECKED OFF ANY PROBLEMS ON THIS QUESTIONNAIRE, HOW DIFFICULT HAVE THESE PROBLEMS MADE IT FOR YOU TO DO YOUR WORK, TAKE CARE OF THINGS AT HOME, OR GET ALONG WITH OTHER PEOPLE: NOT DIFFICULT AT ALL
2. NOT BEING ABLE TO STOP OR CONTROL WORRYING: NOT AT ALL
6. BECOMING EASILY ANNOYED OR IRRITABLE: NOT AT ALL

## 2025-03-28 NOTE — PROGRESS NOTES
[facility]                               Family and Community Medicine Residency Practice                                             [address], [address], [place] [address]        Phone: [phone]      Name:  Gold Ryder  :    1990    Consultants:   Patient Care Team:  Matt Cadet MD as PCP - General  Matt Cadet MD as PCP - Empaneled Provider      Chief Complaint:     Gold Ryder is a 35 y.o. male  who presents today for an established patient care visit with Personalized Prevention Plan Services as noted below.      Assessment/Plan:      1. Essential hypertension  Patient's blood pressure has improved however, he is having symptoms of orthostatic hypotension secondary to medications.  His Coreg was recently decreased by cardiology only 2 days before this visit.  Will continue to monitor for improvement of symptoms and assess the need for further adjustment of his blood pressure medications.    2. History of acute myocardial infarction of inferior wall  3. Chronic systolic heart failure (HCC)  Cardiology following.  Currently on spironolactone, Farxiga, and Entresto.  Will follow-up in 3 months with cardiology for repeat echo.  Continue aspirin, clopidogrel, statin.  No evidence of exacerbation.  4. Type 2 diabetes mellitus without complication, without long-term current use of insulin (HCC)  Patient's blood glucoses appear to be uncontrolled based on his recorded readings.  Currently on 10 units of insulin nightly, Farxiga, metformin.  Have adjusted patient's insulin regimen to take 12 units at night when his blood sugars above 140 in the morning, 8 if it is below 100, and 10 for blood sugars between 100-140.  Patient also given come continuous glucose monitor to assist with accurate blood sugar measurement.  Will follow-up in 1 month to assess response and recheck A1c.         Health Maintenance Due:  Health Maintenance Due   Topic

## 2025-03-28 NOTE — PATIENT INSTRUCTIONS
Check blood sugars:     1. Right when you wake up, before you have eaten or had anything to drink (other than water)  2. If on mealtime insulin also check 2 hours after meals (breakfast, lunch, and dinner) or before you eat your meal     Goal Blood Sugars  Morning Fasting     2 Hours After Meals  Less than 180        Titrating your night time insulin:     If your Morning Glucose is HIGHER than 140, increase your night time dose of insulin by 2 units every 2 days.     Example.   Morning glucose is 175, tonight do 12 units. Tomorrow do 12 units. The next morning if sugar is 130, continue dose of 12 units nightly.     If morning sugar was 150, increase by 2 units again to 14 units for two days.     Call office if questions: [phone]

## 2025-04-02 ENCOUNTER — PATIENT MESSAGE (OUTPATIENT)
Dept: PRIMARY CARE CLINIC | Age: 35
End: 2025-04-02

## 2025-04-02 DIAGNOSIS — E11.65 TYPE 2 DIABETES MELLITUS WITH HYPERGLYCEMIA, UNSPECIFIED WHETHER LONG TERM INSULIN USE (HCC): ICD-10-CM

## 2025-04-02 DIAGNOSIS — K21.9 GASTROESOPHAGEAL REFLUX DISEASE, UNSPECIFIED WHETHER ESOPHAGITIS PRESENT: Primary | ICD-10-CM

## 2025-04-03 RX ORDER — FAMOTIDINE 20 MG/1
20 TABLET, FILM COATED ORAL 2 TIMES DAILY PRN
Qty: 60 TABLET | Refills: 2 | Status: SHIPPED | OUTPATIENT
Start: 2025-04-03

## 2025-04-03 NOTE — TELEPHONE ENCOUNTER
Patient records say he should be taking TWO metformin tablets TWICE daily, so 4 tablets total in a day.

## 2025-05-23 ENCOUNTER — OFFICE VISIT (OUTPATIENT)
Dept: PRIMARY CARE CLINIC | Age: 35
End: 2025-05-23
Payer: COMMERCIAL

## 2025-05-23 VITALS
HEART RATE: 88 BPM | OXYGEN SATURATION: 98 % | HEIGHT: 68 IN | SYSTOLIC BLOOD PRESSURE: 98 MMHG | WEIGHT: 262 LBS | DIASTOLIC BLOOD PRESSURE: 62 MMHG | BODY MASS INDEX: 39.71 KG/M2

## 2025-05-23 DIAGNOSIS — E11.9 TYPE 2 DIABETES MELLITUS WITHOUT COMPLICATION, WITHOUT LONG-TERM CURRENT USE OF INSULIN (HCC): ICD-10-CM

## 2025-05-23 DIAGNOSIS — I10 ESSENTIAL HYPERTENSION: ICD-10-CM

## 2025-05-23 DIAGNOSIS — D17.21 LIPOMA OF RIGHT UPPER EXTREMITY: ICD-10-CM

## 2025-05-23 DIAGNOSIS — I50.22 CHRONIC SYSTOLIC HEART FAILURE (HCC): ICD-10-CM

## 2025-05-23 DIAGNOSIS — E11.9 TYPE 2 DIABETES MELLITUS WITHOUT COMPLICATION, WITHOUT LONG-TERM CURRENT USE OF INSULIN (HCC): Primary | ICD-10-CM

## 2025-05-23 DIAGNOSIS — I25.2 HISTORY OF ACUTE MYOCARDIAL INFARCTION OF INFERIOR WALL: ICD-10-CM

## 2025-05-23 LAB
ALBUMIN SERPL-MCNC: 4.6 G/DL (ref 3.4–5)
ALBUMIN/GLOB SERPL: 1.8 {RATIO} (ref 1.1–2.2)
ALP SERPL-CCNC: 86 U/L (ref 40–129)
ALT SERPL-CCNC: 42 U/L (ref 10–40)
ANION GAP SERPL CALCULATED.3IONS-SCNC: 11 MMOL/L (ref 3–16)
AST SERPL-CCNC: 26 U/L (ref 15–37)
BILIRUB SERPL-MCNC: 0.3 MG/DL (ref 0–1)
BUN SERPL-MCNC: 14 MG/DL (ref 7–20)
CALCIUM SERPL-MCNC: 9.9 MG/DL (ref 8.3–10.6)
CHLORIDE SERPL-SCNC: 104 MMOL/L (ref 99–110)
CO2 SERPL-SCNC: 23 MMOL/L (ref 21–32)
CREAT SERPL-MCNC: 1 MG/DL (ref 0.9–1.3)
GFR SERPLBLD CREATININE-BSD FMLA CKD-EPI: >90 ML/MIN/{1.73_M2}
GLUCOSE SERPL-MCNC: 110 MG/DL (ref 70–99)
POTASSIUM SERPL-SCNC: 4.5 MMOL/L (ref 3.5–5.1)
PROT SERPL-MCNC: 7.2 G/DL (ref 6.4–8.2)
SODIUM SERPL-SCNC: 138 MMOL/L (ref 136–145)

## 2025-05-23 PROCEDURE — 3074F SYST BP LT 130 MM HG: CPT

## 2025-05-23 PROCEDURE — 3078F DIAST BP <80 MM HG: CPT

## 2025-05-23 PROCEDURE — 99214 OFFICE O/P EST MOD 30 MIN: CPT

## 2025-05-23 NOTE — PROGRESS NOTES
[facility]            Family and Community Medicine Residency Practice               [address], [address], [place] [address]            Phone: [phone]      Name:  Gold Ryder  :    1990    Consultants:   Patient Care Team:  Matt Cadet MD as PCP - General  Matt Cadet MD as PCP - Empaneled Provider      Chief Complaint:     Gold Ryder is a 35 y.o. male  who presents today for an established patient care visit with Personalized Prevention Plan Services as noted below.      Assessment/Plan:  1. Type 2 diabetes mellitus without complication, without long-term current use of insulin (HCC)  Blood sugar readings in acceptable ranges based on log, and 100-140 range.  Has not had to increase or decrease his sliding scale regimen.  Will recheck A1c this visit, if improved we will consider decreasing Farxiga to aid in improving his blood pressure.  Advised to continue to try to maintain healthy diet, and exercise as tolerated.  - Hemoglobin A1C; Future  - Comprehensive Metabolic Panel; Future    2. History of acute myocardial infarction of inferior wall  3.  Chronic systolic heart failure (HCC)  Repeat repeat echocardiogram showing improved EF. Patient has been compliant with cardiac rehab and medication regimen per cardiology.  Based on history his exercise capacity is improving. He is following up with cardiology in July.  No history of palpitations, chest pain since his last visit.  Due to low blood pressure in office today, there evaluation be helpful in determining the need for adjusting his medication regimen.    3. Essential hypertension  Coreg recently decreased by cardiology to 12.5 twice daily.  Decreased symptoms of orthostatic hypotension, no history of falls dizziness, lightheadedness since last visit.  Blood pressure of 90s over 60s at night as shown in his blood pressure log.  See above for plan on adjusting based on A1c,

## 2025-05-24 LAB
EST. AVERAGE GLUCOSE BLD GHB EST-MCNC: 119.8 MG/DL
HBA1C MFR BLD: 5.8 %

## 2025-05-25 ENCOUNTER — RESULTS FOLLOW-UP (OUTPATIENT)
Dept: PRIMARY CARE CLINIC | Age: 35
End: 2025-05-25

## 2025-05-29 DIAGNOSIS — D17.21 LIPOMA OF RIGHT UPPER EXTREMITY: Primary | ICD-10-CM

## 2025-05-29 NOTE — PROGRESS NOTES
1. Lipoma of right upper extremity  -Made a referral to Remy Glasgow MD, General Surgery, [facility] [place] GiffordAnshul, DO   PGY-2  [facility] Family and Community Medicine Residency

## 2025-05-29 NOTE — PROGRESS NOTES
1. Lipoma of right upper extremity  -Made a referral to Tin Jason MD, General Surgery, [facility]    Catalino Landers DO   PGY-2  [facility] Family and Community Medicine Residency

## 2025-06-05 ENCOUNTER — OFFICE VISIT (OUTPATIENT)
Dept: SURGERY | Age: 35
End: 2025-06-05
Payer: COMMERCIAL

## 2025-06-05 VITALS — BODY MASS INDEX: 39.71 KG/M2 | WEIGHT: 262 LBS | HEIGHT: 68 IN

## 2025-06-05 DIAGNOSIS — D17.21 BENIGN LIPOMATOUS NEOPLASM OF SKIN AND SUBCUTANEOUS TISSUE OF RIGHT ARM: Primary | ICD-10-CM

## 2025-06-05 PROCEDURE — 99203 OFFICE O/P NEW LOW 30 MIN: CPT | Performed by: SURGERY

## 2025-06-05 NOTE — PROGRESS NOTES
Gold Ryder (:  1990) is a 35 y.o. male,New patient, here for evaluation of the following chief complaint(s):  Surgical Consult (Lipoma RUE, Heart attack less than 6 months ago, unable to stop Plavix)         Assessment & Plan  Benign lipomatous neoplasm of skin and subcutaneous tissue of right arm     Removal in OR under local    Will call to schedule later this year                Subjective   HPI  Chief Complaint: mass    Patient presents for evaluation of a mass on his right ar. Patient reports symptoms of pain with direct pressure. Location of symptoms is lateral right arm. Symptoms were first noted about three months ago. Previous evaluation includes PCP exam. Patient has a history of recent heart issues and is currently on Plavix. Will plan following treatment: removal later this year when Plavix can be held.      Past Medical History:   Diagnosis Date    Anemia     Diabetes mellitus (HCC)     History of myocardial infarction     Obesity     Type 2 diabetes mellitus without complication (HCC) [id number]       No past surgical history on file.    Current Outpatient Medications   Medication Sig Dispense Refill    carvedilol (COREG) 12.5 MG tablet Take 1 tablet by mouth 2 times daily (with meals) 180 tablet 1    atorvastatin (LIPITOR) 40 MG tablet Take 1 tablet by mouth daily 90 tablet 3    aspirin 81 MG chewable tablet Take 1 tablet by mouth daily 90 tablet 3    clopidogrel (PLAVIX) 75 MG tablet Take 1 tablet by mouth daily 90 tablet 1    dapagliflozin (FARXIGA) 10 MG tablet Take 1 tablet by mouth daily 90 tablet 1    sacubitril-valsartan (ENTRESTO) 24-26 MG per tablet Take 1 tablet by mouth 2 times daily 180 tablet 1    spironolactone (ALDACTONE) 25 MG tablet Take 1 tablet by mouth daily 90 tablet 1    insulin glargine (LANTUS SOLOSTAR) 100 UNIT/ML injection pen Inject 12 Units into the skin nightly 5 Adjustable Dose Pre-filled Pen Syringe 5    metFORMIN (GLUCOPHAGE) 1000 MG tablet Take 1 tablet

## 2025-06-06 ENCOUNTER — OFFICE VISIT (OUTPATIENT)
Dept: PRIMARY CARE CLINIC | Age: 35
End: 2025-06-06
Payer: COMMERCIAL

## 2025-06-06 VITALS
OXYGEN SATURATION: 96 % | BODY MASS INDEX: 40.31 KG/M2 | DIASTOLIC BLOOD PRESSURE: 72 MMHG | SYSTOLIC BLOOD PRESSURE: 112 MMHG | HEART RATE: 87 BPM | HEIGHT: 68 IN

## 2025-06-06 DIAGNOSIS — I25.10 CORONARY ARTERY DISEASE INVOLVING NATIVE CORONARY ARTERY OF NATIVE HEART WITHOUT ANGINA PECTORIS: ICD-10-CM

## 2025-06-06 DIAGNOSIS — I10 ESSENTIAL HYPERTENSION: ICD-10-CM

## 2025-06-06 DIAGNOSIS — I50.22 CHRONIC SYSTOLIC HEART FAILURE (HCC): ICD-10-CM

## 2025-06-06 DIAGNOSIS — E11.9 TYPE 2 DIABETES MELLITUS WITHOUT COMPLICATION, WITHOUT LONG-TERM CURRENT USE OF INSULIN (HCC): ICD-10-CM

## 2025-06-06 DIAGNOSIS — R30.0 DYSURIA: ICD-10-CM

## 2025-06-06 DIAGNOSIS — K21.9 GASTROESOPHAGEAL REFLUX DISEASE, UNSPECIFIED WHETHER ESOPHAGITIS PRESENT: ICD-10-CM

## 2025-06-06 LAB
BILIRUBIN, URINE, POC: NEGATIVE
BLOOD URINE, POC: NEGATIVE
GLUCOSE URINE, POC: 1000
KETONES, URINE, POC: NEGATIVE
LEUKOCYTE ESTERASE, URINE, POC: NEGATIVE
NITRITE, URINE, POC: NEGATIVE
PH, URINE, POC: 6 (ref 4.6–8)
PROTEIN,URINE, POC: ABNORMAL
SPECIFIC GRAVITY, URINE, POC: 1.02 (ref 1–1.03)
URINALYSIS CLARITY, POC: CLEAR
URINALYSIS COLOR, POC: YELLOW
UROBILINOGEN, POC: ABNORMAL

## 2025-06-06 PROCEDURE — 3044F HG A1C LEVEL LT 7.0%: CPT | Performed by: FAMILY MEDICINE

## 2025-06-06 PROCEDURE — 81003 URINALYSIS AUTO W/O SCOPE: CPT | Performed by: FAMILY MEDICINE

## 2025-06-06 PROCEDURE — 99214 OFFICE O/P EST MOD 30 MIN: CPT | Performed by: FAMILY MEDICINE

## 2025-06-06 PROCEDURE — 3074F SYST BP LT 130 MM HG: CPT | Performed by: FAMILY MEDICINE

## 2025-06-06 PROCEDURE — 3078F DIAST BP <80 MM HG: CPT | Performed by: FAMILY MEDICINE

## 2025-06-06 RX ORDER — INSULIN GLARGINE 100 [IU]/ML
12 INJECTION, SOLUTION SUBCUTANEOUS NIGHTLY
Qty: 5 ADJUSTABLE DOSE PRE-FILLED PEN SYRINGE | Refills: 5 | Status: SHIPPED | OUTPATIENT
Start: 2025-06-06

## 2025-06-06 RX ORDER — CLOPIDOGREL BISULFATE 75 MG/1
75 TABLET ORAL DAILY
Qty: 90 TABLET | Refills: 1 | Status: SHIPPED | OUTPATIENT
Start: 2025-06-06

## 2025-06-06 RX ORDER — DULAGLUTIDE 3 MG/.5ML
3 INJECTION, SOLUTION SUBCUTANEOUS WEEKLY
Qty: 6 ML | Refills: 3 | Status: SHIPPED | OUTPATIENT
Start: 2025-06-06

## 2025-06-06 RX ORDER — CARVEDILOL 12.5 MG/1
12.5 TABLET ORAL 2 TIMES DAILY WITH MEALS
Qty: 180 TABLET | Refills: 1 | Status: SHIPPED | OUTPATIENT
Start: 2025-06-06

## 2025-06-06 RX ORDER — ATORVASTATIN CALCIUM 40 MG/1
40 TABLET, FILM COATED ORAL DAILY
Qty: 90 TABLET | Refills: 3 | Status: SHIPPED | OUTPATIENT
Start: 2025-06-06

## 2025-06-06 RX ORDER — SPIRONOLACTONE 25 MG/1
25 TABLET ORAL DAILY
Qty: 90 TABLET | Refills: 1 | Status: SHIPPED | OUTPATIENT
Start: 2025-06-06

## 2025-06-06 RX ORDER — FAMOTIDINE 20 MG/1
20 TABLET, FILM COATED ORAL 2 TIMES DAILY PRN
Qty: 60 TABLET | Refills: 2 | Status: SHIPPED | OUTPATIENT
Start: 2025-06-06

## 2025-06-06 RX ORDER — DAPAGLIFLOZIN 10 MG/1
10 TABLET, FILM COATED ORAL DAILY
Qty: 90 TABLET | Refills: 1 | Status: SHIPPED | OUTPATIENT
Start: 2025-06-06

## 2025-06-06 ASSESSMENT — ENCOUNTER SYMPTOMS
ABDOMINAL PAIN: 0
CONSTIPATION: 0
SHORTNESS OF BREATH: 0
COUGH: 0
GASTROINTESTINAL NEGATIVE: 1
VOMITING: 0
CHEST TIGHTNESS: 0
DIARRHEA: 0
ABDOMINAL DISTENTION: 0
WHEEZING: 0
NAUSEA: 0

## 2025-06-06 NOTE — PROGRESS NOTES
Matt Cadet MD  [facility]   Family and Community Medicine Residency Practice   [address], [address]  [facility] [address]  Phone: [phone]  Fax: [phone]    Gold Ryder (: 1990) is a 35 y.o. male here for evaluation of the following chief complaint(s):  Follow-up and Medication Refill      Assessment/Plan:  1. Dysuria  Urine dip without signs of infection, continue to monitor, encouraged good hydration   - AMB POC URINALYSIS DIP STICK AUTO W/O MICRO    2. Type 2 diabetes mellitus without complication, without long-term current use of insulin (HCC)  Well controlled.  NO change to current regimen   - atorvastatin (LIPITOR) 40 MG tablet; Take 1 tablet by mouth daily  Dispense: 90 tablet; Refill: 3  - dapagliflozin (FARXIGA) 10 MG tablet; Take 1 tablet by mouth daily  Dispense: 90 tablet; Refill: 1  - Dulaglutide (TRULICITY) 3 MG/0.5ML SOAJ; Inject 3 mg into the skin once a week  Dispense: 6 mL; Refill: 3  - insulin glargine (LANTUS SOLOSTAR) 100 UNIT/ML injection pen; Inject 12 Units into the skin nightly  Dispense: 5 Adjustable Dose Pre-filled Pen Syringe; Refill: 5  - metFORMIN (GLUCOPHAGE) 1000 MG tablet; Take 1 tablet by mouth 2 times daily (with meals)  Dispense: 180 tablet; Refill: 1    3. Essential hypertension  Well controlled.   - carvedilol (COREG) 12.5 MG tablet; Take 1 tablet by mouth 2 times daily (with meals)  Dispense: 180 tablet; Refill: 1    4. Coronary artery disease involving native coronary artery of native heart without angina pectoris  5. Chronic systolic heart failure (HCC)  Doing well on current regimen.  Continues with cardiac rehab.    - carvedilol (COREG) 12.5 MG tablet; Take 1 tablet by mouth 2 times daily (with meals)  Dispense: 180 tablet; Refill: 1  - clopidogrel (PLAVIX) 75 MG tablet; Take 1 tablet by mouth daily  Dispense: 90 tablet; Refill: 1  - dapagliflozin (FARXIGA) 10 MG tablet; Take 1 tablet by mouth daily  Dispense: 90